# Patient Record
Sex: FEMALE | Race: WHITE | URBAN - METROPOLITAN AREA
[De-identification: names, ages, dates, MRNs, and addresses within clinical notes are randomized per-mention and may not be internally consistent; named-entity substitution may affect disease eponyms.]

---

## 2023-06-02 ENCOUNTER — OFFICE VISIT (OUTPATIENT)
Dept: OTOLARYNGOLOGY | Facility: CLINIC | Age: 83
End: 2023-06-02

## 2023-06-02 VITALS — WEIGHT: 132 LBS | HEIGHT: 62 IN | BODY MASS INDEX: 24.29 KG/M2

## 2023-06-02 DIAGNOSIS — R09.82 ALLERGIC RHINITIS WITH POSTNASAL DRIP: Primary | ICD-10-CM

## 2023-06-02 DIAGNOSIS — J30.9 ALLERGIC RHINITIS WITH POSTNASAL DRIP: Primary | ICD-10-CM

## 2023-06-02 DIAGNOSIS — H61.23 CERUMEN DEBRIS ON TYMPANIC MEMBRANE OF BOTH EARS: ICD-10-CM

## 2023-06-02 DIAGNOSIS — J34.89 NASAL SEPTAL PERFORATION: ICD-10-CM

## 2023-06-02 PROCEDURE — 99203 OFFICE O/P NEW LOW 30 MIN: CPT | Performed by: SPECIALIST

## 2023-06-02 PROCEDURE — 1159F MED LIST DOCD IN RCRD: CPT | Performed by: SPECIALIST

## 2023-06-02 PROCEDURE — 1160F RVW MEDS BY RX/DR IN RCRD: CPT | Performed by: SPECIALIST

## 2023-06-02 PROCEDURE — 3008F BODY MASS INDEX DOCD: CPT | Performed by: SPECIALIST

## 2023-06-02 RX ORDER — LEVOTHYROXINE SODIUM 0.05 MG/1
1 TABLET ORAL
COMMUNITY
Start: 2022-07-19

## 2023-06-02 RX ORDER — PRAVASTATIN SODIUM 40 MG
40 TABLET ORAL DAILY
COMMUNITY
Start: 2022-10-24

## 2023-06-02 RX ORDER — LEVETIRACETAM 250 MG/1
1 TABLET ORAL AS DIRECTED
COMMUNITY
Start: 2021-02-01

## 2023-06-02 RX ORDER — ALPRAZOLAM 0.25 MG/1
TABLET ORAL
COMMUNITY

## 2023-06-02 RX ORDER — ONDANSETRON 4 MG/1
1 TABLET, FILM COATED ORAL AS DIRECTED
COMMUNITY

## 2023-06-02 RX ORDER — LOSARTAN POTASSIUM 50 MG/1
1 TABLET ORAL AS DIRECTED
COMMUNITY
Start: 2021-10-27

## 2023-06-02 RX ORDER — POTASSIUM CHLORIDE 20 MEQ/1
TABLET, EXTENDED RELEASE ORAL
COMMUNITY

## 2023-06-02 RX ORDER — HYDROCODONE BITARTRATE AND ACETAMINOPHEN 7.5; 325 MG/1; MG/1
TABLET ORAL
COMMUNITY
Start: 2022-01-06

## 2023-06-02 RX ORDER — LISINOPRIL 10 MG/1
1 TABLET ORAL AS DIRECTED
COMMUNITY

## 2023-06-02 RX ORDER — AMOXICILLIN 875 MG/1
TABLET, COATED ORAL
COMMUNITY

## 2023-06-02 RX ORDER — OMEPRAZOLE 20 MG/1
20 CAPSULE, DELAYED RELEASE ORAL AS DIRECTED
COMMUNITY

## 2023-06-02 RX ORDER — ASPIRIN 81 MG/1
TABLET, CHEWABLE ORAL
COMMUNITY

## 2023-06-02 RX ORDER — AZELASTINE 1 MG/ML
SPRAY, METERED NASAL AS DIRECTED
COMMUNITY

## 2023-06-02 NOTE — PATIENT INSTRUCTIONS
Cerumen was fully cleaned from both ears. I asked you to stop the Flonase and try Zyrtec or cetirizine 10 mg at bedtime. You have an incidental septal perforation. Follow-up with any additional questions or problems.

## 2023-10-13 ENCOUNTER — OFFICE VISIT (OUTPATIENT)
Dept: OTOLARYNGOLOGY | Facility: CLINIC | Age: 83
End: 2023-10-13

## 2023-10-13 VITALS — HEIGHT: 62 IN | BODY MASS INDEX: 24.29 KG/M2 | WEIGHT: 132 LBS

## 2023-10-13 DIAGNOSIS — R09.82 POSTNASAL DRIP: ICD-10-CM

## 2023-10-13 DIAGNOSIS — J34.89 NASAL SEPTAL PERFORATION: Primary | ICD-10-CM

## 2023-10-13 PROCEDURE — 3008F BODY MASS INDEX DOCD: CPT | Performed by: SPECIALIST

## 2023-10-13 PROCEDURE — 99213 OFFICE O/P EST LOW 20 MIN: CPT | Performed by: SPECIALIST

## 2023-10-13 PROCEDURE — 1160F RVW MEDS BY RX/DR IN RCRD: CPT | Performed by: SPECIALIST

## 2023-10-13 PROCEDURE — 1159F MED LIST DOCD IN RCRD: CPT | Performed by: SPECIALIST

## 2023-10-13 RX ORDER — IPRATROPIUM BROMIDE 42 UG/1
2 SPRAY, METERED NASAL 3 TIMES DAILY
Qty: 15 ML | Refills: 3 | Status: SHIPPED | OUTPATIENT
Start: 2023-10-13

## 2023-10-13 NOTE — PATIENT INSTRUCTIONS
Placed on a trial of Atrovent nasal spray 2 sprays to each nostril up to 3 times daily for your postnasal drip. Follow-up in 6 months time, sooner if problems.

## 2023-11-17 ENCOUNTER — OFFICE VISIT (OUTPATIENT)
Dept: OTOLARYNGOLOGY | Facility: CLINIC | Age: 83
End: 2023-11-17

## 2023-11-17 VITALS — HEIGHT: 62 IN | WEIGHT: 132 LBS | BODY MASS INDEX: 24.29 KG/M2

## 2023-11-17 DIAGNOSIS — J34.89 NASAL SEPTAL PERFORATION: ICD-10-CM

## 2023-11-17 DIAGNOSIS — R09.82 POSTNASAL DRIP: ICD-10-CM

## 2023-11-17 DIAGNOSIS — H92.03 REFERRED OTALGIA OF BOTH EARS: Primary | ICD-10-CM

## 2023-11-17 PROCEDURE — 3008F BODY MASS INDEX DOCD: CPT | Performed by: SPECIALIST

## 2023-11-17 PROCEDURE — 1160F RVW MEDS BY RX/DR IN RCRD: CPT | Performed by: SPECIALIST

## 2023-11-17 PROCEDURE — 1159F MED LIST DOCD IN RCRD: CPT | Performed by: SPECIALIST

## 2023-11-17 PROCEDURE — 99214 OFFICE O/P EST MOD 30 MIN: CPT | Performed by: SPECIALIST

## 2023-11-17 RX ORDER — AMOXICILLIN AND CLAVULANATE POTASSIUM 875; 125 MG/1; MG/1
1 TABLET, FILM COATED ORAL 2 TIMES DAILY
COMMUNITY
Start: 2023-11-10

## 2023-11-17 NOTE — PATIENT INSTRUCTIONS
No evidence of middle ear fluid. Also reviewed CT scan and MRI scan and neither of which made mention of mastoiditis. Suspect the pain is referred from either ear past mandibular trauma and possible TMJ arthralgia. The other option would be spinal stenosis. You can consider an MRI of the cervical spine. You can also consider a trial of pregabalin or gabapentin. You have a septal perforation. Try Zyrtec or Xyzal.  Decrease nasal irrigation to 1 or at most 2 times per day.

## 2024-04-12 ENCOUNTER — OFFICE VISIT (OUTPATIENT)
Dept: OTOLARYNGOLOGY | Facility: CLINIC | Age: 84
End: 2024-04-12
Payer: MEDICARE

## 2024-04-12 VITALS — WEIGHT: 133 LBS | BODY MASS INDEX: 24.48 KG/M2 | HEIGHT: 62 IN

## 2024-04-12 DIAGNOSIS — H61.23 CERUMEN DEBRIS ON TYMPANIC MEMBRANE OF BOTH EARS: ICD-10-CM

## 2024-04-12 DIAGNOSIS — J34.89 NASAL CRUSTING: ICD-10-CM

## 2024-04-12 DIAGNOSIS — J34.89 NASAL SEPTAL PERFORATION: Primary | ICD-10-CM

## 2024-04-12 PROCEDURE — 1159F MED LIST DOCD IN RCRD: CPT | Performed by: SPECIALIST

## 2024-04-12 PROCEDURE — 99213 OFFICE O/P EST LOW 20 MIN: CPT | Performed by: SPECIALIST

## 2024-04-12 PROCEDURE — 69210 REMOVE IMPACTED EAR WAX UNI: CPT | Performed by: SPECIALIST

## 2024-04-12 PROCEDURE — 1160F RVW MEDS BY RX/DR IN RCRD: CPT | Performed by: SPECIALIST

## 2024-04-12 PROCEDURE — 3008F BODY MASS INDEX DOCD: CPT | Performed by: SPECIALIST

## 2024-04-12 RX ORDER — CEPHALEXIN 500 MG/1
500 CAPSULE ORAL EVERY 8 HOURS
Qty: 21 CAPSULE | Refills: 0 | Status: SHIPPED | OUTPATIENT
Start: 2024-04-12

## 2024-04-13 NOTE — PROGRESS NOTES
Michelle Alvarenga is a 83 year old female.   Chief Complaint   Patient presents with    Follow - Up     referred otalgia of both ears     HPI:   Patient here to get her ears cleaned and checked.  Also wanted to discuss her septal perforation.    Current Outpatient Medications   Medication Sig Dispense Refill    cephalexin 500 MG Oral Cap Take 1 capsule (500 mg total) by mouth every 8 (eight) hours. 21 capsule 0    ipratropium 0.06 % Nasal Solution 2 sprays by Nasal route 3 (three) times daily. 15 mL 3    ALPRAZolam 0.25 MG Oral Tab alprazolam 0.25 mg tablet   TAKE ONE OR TWO TABLETS BY MOUTH AT BEDTIME AS NEEDED FOR ANXIETY      aspirin 81 MG Oral Chew Tab aspirin 81 mg chewable tablet   CHEW AND SWALLOW ONE TABLET BY MOUTH ONE TIME DAILY      azelastine 0.1 % Nasal Solution by Nasal route As Directed.      HYDROcodone-acetaminophen 7.5-325 MG Oral Tab hydrocodone 7.5 mg-acetaminophen 325 mg tablet   Take 1 tablet by mouth if needed in the morning and at bedtime for severe pain.      levETIRAcetam 250 MG Oral Tab Take 1 tablet (250 mg total) by mouth As Directed.      levothyroxine 50 MCG Oral Tab Take 1 tablet (50 mcg total) by mouth before breakfast.      losartan 50 MG Oral Tab Take 1 tablet (50 mg total) by mouth As Directed.      lisinopril 10 MG Oral Tab Take 1 tablet (10 mg total) by mouth As Directed.      metoprolol tartrate 25 MG Oral Tab metoprolol tartrate 25 mg tablet   TAKE ONE TABLET BY MOUTH TWICE DAILY      omeprazole 20 MG Oral Capsule Delayed Release Take 1 capsule (20 mg total) by mouth As Directed.      mupirocin 2 % External Ointment As Directed.      ondansetron (ZOFRAN) 4 mg tablet Take 1 tablet (4 mg total) by mouth As Directed.      potassium chloride 20 MEQ Oral Tab CR potassium chloride ER 20 mEq tablet,extended release(part/cryst)      pravastatin 40 MG Oral Tab Take 1 tablet (40 mg total) by mouth daily.      amoxicillin clavulanate 875-125 MG Oral Tab Take 1 tablet by mouth 2 (two) times  daily. (Patient not taking: Reported on 4/12/2024)      amoxicillin 875 MG Oral Tab amoxicillin 875 mg tablet   Take 1 tablet by mouth 2 times a Day (Patient not taking: Reported on 4/12/2024)        Past Medical History:    Essential hypertension      Social History:  Social History     Socioeconomic History    Marital status:    Tobacco Use    Smoking status: Never    Smokeless tobacco: Never   Vaping Use    Vaping status: Never Used   Substance and Sexual Activity    Alcohol use: Yes     Comment: glass of wine occasionally    Drug use: Never        REVIEW OF SYSTEMS:   GENERAL HEALTH: feels well otherwise  GENERAL : denies fever, chills, sweats, weight loss, weight gain  SKIN: denies any unusual skin lesions or rashes  RESPIRATORY: denies shortness of breath with exertion  NEURO: denies headaches    EXAM:   Ht 5' 2\" (1.575 m)   Wt 133 lb (60.3 kg)   BMI 24.33 kg/m²   System Details   Skin Inspection - Normal.   Constitutional Overall appearance - Normal.   Head/Face Facial features - Normal. Eyebrows - Normal. Skull - Normal.   Eyes Conjunctiva - Right: Normal, Left: Normal. Pupil - Right: Normal, Left: Normal.    Ears Inspection - Right: Normal, Left: Normal.   Ears = bilateral cerumen occlussions.    Fully cleaned under microscope using instrumentation and suctioning.    Normal tympanic membranes.     Nasal External nose - Normal.   Nasal septum -moderate septal perforation  Turbinates -nasal crusting and purulent postnasal drainage   Oral/Oropharynx Lips - Normal, Tonsils - Normal, Tongue - Normal    Neck Exam Inspection - Normal. Palpation - Normal. Parotid gland - Normal. Thyroid gland - Normal.   Lymph Detail Submental. Submandibular. Anterior cervical. Posterior cervical. Supraclavicular all without enlargement   Psychiatric Orientation - Oriented to time, place, person & situation. Appropriate mood and affect.   Neurological Memory - Normal. Cranial nerves - Cranial nerves II through XII grossly  intact.     ASSESSMENT AND PLAN:   1. Nasal septal perforation  Patient to consider placement of a septal button.  This can be done under MAC anesthesia.  Follow-up in 4 to 6 months time, sooner if problems.    2. Cerumen debris on tympanic membrane of both ears  Fully cleaned.  - Removal Impacted Cerumen Requiring Instrumentation, Bilateral    3. Nasal crusting  Please send a 1 week trial of Keflex.      The patient indicates understanding of these issues and agrees to the plan.      Cassy Smith MD  4/12/2024  9:20 PM

## 2024-04-13 NOTE — PATIENT INSTRUCTIONS
Patient with a septal perforation which is moderate in size.  There is bilateral nasal crusting.  You were placed on a trial of Keflex.  Your ears were fully cleaned of cerumen.  We discussed a nasal button which can be placed under MAC anesthesia.  If you decide to do this please let me know so it can be scheduled.

## 2024-04-30 ENCOUNTER — TELEPHONE (OUTPATIENT)
Dept: OTOLARYNGOLOGY | Facility: CLINIC | Age: 84
End: 2024-04-30

## 2024-04-30 DIAGNOSIS — J34.89 NASAL SEPTAL PERFORATION: Primary | ICD-10-CM

## 2024-04-30 NOTE — TELEPHONE ENCOUNTER
Patient scheduled for PLACEMENT OF SEPTAL BUTTON on 5/13/24 at [facility].     Faxed medical clearance request to PCP.

## 2024-05-01 ENCOUNTER — TELEPHONE (OUTPATIENT)
Dept: CASE MANAGEMENT | Age: 84
End: 2024-05-01

## 2024-05-01 NOTE — TELEPHONE ENCOUNTER
Good Morning    Patient is scheduled for DOS 5/13/24.     is unable to obtain prior auth since [facility] PCP.    PCP is Dr Carlos Falcon thru [facility].    PCP office will need to obtain PA for upcoming surgery.    Please confirm you have received this message and reached out to PCP to have them start PA process on patients behalf.    [facility] may not be within network with this specific health plan and patient may be redirected.     Thank you for your help    Cassy  [facility]

## 2024-05-02 NOTE — TELEPHONE ENCOUNTER
Called PCP office and informed them to start the PA for surgery. The office will also reach out to patient to get medical clearance before surgery.

## 2024-05-06 ENCOUNTER — TELEPHONE (OUTPATIENT)
Dept: OTOLARYNGOLOGY | Facility: CLINIC | Age: 84
End: 2024-05-06

## 2024-05-06 NOTE — TELEPHONE ENCOUNTER
Per patient she has a procedure next week and her primary care physician Lilliam with [facility]  needs the request for an EKG.  Please advise

## 2024-05-07 RX ORDER — ACETAMINOPHEN 500 MG
500 TABLET ORAL
COMMUNITY

## 2024-05-07 NOTE — TELEPHONE ENCOUNTER
Called PCP office. Patient is scheduled for preoperative physical on 5/6/24. Office does not need order for EKG.     Refaxed paperwork to PCP office to start prior authorization for surgery scheduled on 5/13/24.     Left voice message for patient stating that her PCP will order an EKG if needed,

## 2024-05-09 ENCOUNTER — TELEPHONE (OUTPATIENT)
Dept: OTOLARYNGOLOGY | Facility: CLINIC | Age: 84
End: 2024-05-09

## 2024-05-09 NOTE — TELEPHONE ENCOUNTER
Received clearance fax from PCP office with medical clearance for surgery.     Spoke with PCP staff about PA for surgery and was notified that they do not obtain prior authorizations for surgery.

## 2024-05-10 NOTE — DISCHARGE INSTRUCTIONS
HOME INSTRUCTIONS  Take tylenol or norco 7.5 mg for pain  Diet as tolerated  Do not manipulate your nose  Follow up in 2 weeks time, any office  Call with any problems.  No prescriptions  [facility] HOME CARE INSTRUCTIONS: POST-OP ANESTHESIA  The medication that you received for sedation or general anesthesia can last up to 24 hours. Your judgment and reflexes may be altered, even if you feel like your normal self.      We Recommend:   Do not drive any motor vehicle or bicycle   Avoid mowing the lawn, playing sports, or working with power tools/applicances (power saws, electric knives or mixers)   That you have someone stay with you on your first night home   Do not drink alcohol or take sleeping pills or tranquilizers   Do not sign legal documents within 24 hours of your procedure   If you had a nerve block for your surgery, take extra care not to put any pressure on your arm or hand for 24 hours    It is normal:  For you to have a sore throat if you had a breathing tube during surgery (while you were asleep!). The sore throat should get better within 48 hours. You can gargle with warm salt water (1/2 tsp in 4 oz warm water) or use a throat lozenge for comfort  To feel muscle aches or soreness especially in the abdomen, chest or neck. The achy feeling should go away in the next 24 hours  To feel weak, sleepy or \"wiped out\". Your should start feeling better in the next 24 hours.   To experience mild discomforts such as sore lip or tongue, headache, cramps, gas pains or a bloated feeling in your abdomen.   To experience mild back pain or soreness for a day or two if you had spinal or epidural anesthesia.   If you had laparoscopic surgery, to feel shoulder pain or discomfort on the day of surgery.   For some patients to have nausea after surgery/anesthesia    If you feel nausea or experience vomiting:   Try to move around less.   Eat less than usual or drink only liquids until the next morning   Nausea should resolve in  about 24 hours    If you have a problem when you are at home:    Call your surgeons office   Discharge Instructions: After Your Surgery  You’ve just had surgery. During surgery, you were given medicine called anesthesia to keep you relaxed and free of pain. After surgery, you may have some pain or nausea. This is common. Here are some tips for feeling better and getting well after surgery.   Going home  Your healthcare provider will show you how to take care of yourself when you go home. They'll also answer your questions. Have an adult family member or friend drive you home. For the first 24 hours after your surgery:   Don't drive or use heavy equipment.  Don't make important decisions or sign legal papers.  Take medicines as directed.  Don't drink alcohol.  Have someone stay with you, if needed. They can watch for problems and help keep you safe.  Be sure to go to all follow-up visits with your healthcare provider. And rest after your surgery for as long as your provider tells you to.   Coping with pain  If you have pain after surgery, pain medicine will help you feel better. Take it as directed, before pain becomes severe. Also, ask your healthcare provider or pharmacist about other ways to control pain. This might be with heat, ice, or relaxation. And follow any other instructions your surgeon or nurse gives you.      Stay on schedule with your medicine.     Tips for taking pain medicine  To get the best relief possible, remember these points:   Pain medicines can upset your stomach. Taking them with a little food may help.  Most pain relievers taken by mouth need at least 20 to 30 minutes to start to work.  Don't wait till your pain becomes severe before you take your medicine. Try to time your medicine so that you can take it before starting an activity. This might be before you get dressed, go for a walk, or sit down for dinner.  Constipation is a common side effect of some pain medicines. Call your  healthcare provider before taking any medicines such as laxatives or stool softeners to help ease constipation. Also ask if you should skip any foods. Drinking lots of fluids and eating foods such as fruits and vegetables that are high in fiber can also help. Remember, don't take laxatives unless your surgeon has prescribed them.  Drinking alcohol and taking pain medicine can cause dizziness and slow your breathing. It can even be deadly. Don't drink alcohol while taking pain medicine.  Pain medicine can make you react more slowly to things. Don't drive or run machinery while taking pain medicine.  Your healthcare provider may tell you to take acetaminophen to help ease your pain. Ask them how much you're supposed to take each day. Acetaminophen or other pain relievers may interact with your prescription medicines or other over-the-counter (OTC) medicines. Some prescription medicines have acetaminophen and other ingredients in them. Using both prescription and OTC acetaminophen for pain can cause you to accidentally overdose. Read the labels on your OTC medicines with care. This will help you to clearly know the list of ingredients, how much to take, and any warnings. It may also help you not take too much acetaminophen. If you have questions or don't understand the information, ask your pharmacist or healthcare provider to explain it to you before you take the OTC medicine.   Managing nausea  Some people have an upset stomach (nausea) after surgery. This is often because of anesthesia, pain, or pain medicine, less movement of food in the stomach, or the stress of surgery. These tips will help you handle nausea and eat healthy foods as you get better. If you were on a special food plan before surgery, ask your healthcare provider if you should follow it while you get better. Check with your provider on how your eating should progress. It may depend on the surgery you had. These general tips may help:   Don't push  yourself to eat. Your body will tell you when to eat and how much.  Start off with clear liquids and soup. They're easier to digest.  Next try semi-solid foods as you feel ready. These include mashed potatoes, applesauce, and gelatin.  Slowly move to solid foods. Don’t eat fatty, rich, or spicy foods at first.  Don't force yourself to have 3 large meals a day. Instead eat smaller amounts more often.  Take pain medicines with a small amount of solid food, such as crackers or toast. This helps prevent nausea.  When to call your healthcare provider  Call your healthcare provider right away if you have any of these:   You still have too much pain, or the pain gets worse, after taking the medicine. The medicine may not be strong enough. Or there may be a complication from the surgery.  You feel too sleepy, dizzy, or groggy. The medicine may be too strong.  Side effects such as nausea or vomiting. Your healthcare provider may advise taking other medicines to .  Skin changes such as rash, itching, or hives. This may mean you have an allergic reaction. Your provider may advise taking other medicines.  The incision looks different (for instance, part of it opens up).  Bleeding or fluid leaking from the incision site, and weren't told to expect that.  Fever of 100.4°F (38°C) or higher, or as directed by your provider.  Call 911  Call 911 right away if you have:   Trouble breathing  Facial swelling    If you have obstructive sleep apnea   You were given anesthesia medicine during surgery to keep you comfortable and free of pain. After surgery, you may have more apnea spells because of this medicine and other medicines you were given. The spells may last longer than normal.    At home:  Keep using the continuous positive airway pressure (CPAP) device when you sleep. Unless your healthcare provider tells you not to, use it when you sleep, day or night. CPAP is a common device used to treat obstructive sleep apnea.  Talk with  your provider before taking any pain medicine, muscle relaxants, or sedatives. Your provider will tell you about the possible dangers of taking these medicines.  Contact your provider if your sleeping changes a lot even when taking medicines as directed.  Saleem last reviewed this educational content on 10/1/2021  © 8294-8519 [employer]. All rights reserved. This information is not intended as a substitute for professional medical care. Always follow your healthcare professional's instructions.      Take tylenol or norco 7.5 mg for pain  Diet as tolerated  Do not manipulate your nose  Follow up in 2 weeks time, any office  Call with any problems.  No prescriptions

## 2024-05-10 NOTE — TELEPHONE ENCOUNTER
Obtained medical clearance for upcoming procedure from RAVI Parekh.  I will have the clearance as well as EKG and labs scanned to the chart.

## 2024-05-12 NOTE — H&P
[facility]  part of [facility]    History and Physical    Michelle Alvarenga Patient Status:  Hospital Outpatient Surgery    1940 MRN [id number]   Location [facility] OPERATING ROOM Attending Cassy Smith MD   Hosp Day # 0 PCP PHYSICIAN NONSTAFF     Date:  2024  Date of Admission:  (Not on file)    History provided by:patient  HPI:   No chief complaint on file.    HPI  Patient with post nasal drainage and a long standing septal perforation.      History     Past Medical History:    Cancer (HCC)    breast cancer    Essential hypertension    Exposure to medical diagnostic radiation    Hearing impairment    hearing aides    High blood pressure    Visual impairment    glasses     Past Surgical History:   Procedure Laterality Date    Cholecystectomy      Hip replacement surgery Right     Lumpectomy left Left     Other surgical history Left     Left Jaw Repair    Total hip replacement       History reviewed. No pertinent family history.  Social History:  Social History     Socioeconomic History    Marital status:    Tobacco Use    Smoking status: Former     Types: Cigarettes    Smokeless tobacco: Never   Vaping Use    Vaping status: Never Used   Substance and Sexual Activity    Alcohol use: Yes     Comment: glass of wine occasionally    Drug use: Never     Allergies/Medications:   Allergies: No Known Allergies  Medications: astelin, benedryl. Calcium, escitalopram, norco,  oral tablet, levothyroxine, losartan, potassium chloride, pravastatin, triamcinolone, xanax    Review of Systems:   Review of Systems  Postnasal drainage with septal perforation.    Physical Exam:   Vital Signs:  Height 5' 2\" (1.575 m), weight 140 lb (63.5 kg).  Physical Exam  Ears: normal  Nose: moderate septal perforation  Mouth: normal  Neck: no adenopathy  Lungs: clear  Heart: regular rate and rhythm.  No murmurs  Cervical Papanicolaou to be done in MD's office    Results:   No results found for:  \"WBC\", \"HGB\", \"HCT\", \"PLT\", \"CREATSERUM\", \"BUN\", \"NA\", \"K\", \"CL\", \"CO2\", \"GLU\", \"CA\", \"ALB\", \"ALKPHO\", \"BILT\", \"TP\", \"AST\", \"ALT\", \"PTT\", \"INR\", \"PT\", \"T4F\", \"TSH\", \"TSHREFLEX\", \"HARESH\", \"LIP\", \"GGT\", \"PSA\", \"DDIMER\", \"ESRML\", \"ESRPF\", \"CRP\", \"BNP\", \"MG\", \"PHOS\", \"TROP\", \"TROPHS\", \"CK\", \"CKMB\", \"SUE\", \"RPR\", \"B12\", \"ETOH\", \"POCGLU\"  No results found.        Assessment/Plan:   Impression: septal perforation with postnasal drainage  Plan: placement of septal perforation.    Medical clearance scanned to chart.          Cassy Smith MD  5/12/2024

## 2024-05-13 ENCOUNTER — ANESTHESIA (OUTPATIENT)
Dept: SURGERY | Facility: HOSPITAL | Age: 84
End: 2024-05-13

## 2024-05-13 ENCOUNTER — ANESTHESIA EVENT (OUTPATIENT)
Dept: SURGERY | Facility: HOSPITAL | Age: 84
End: 2024-05-13

## 2024-05-13 ENCOUNTER — TELEPHONE (OUTPATIENT)
Dept: OTOLARYNGOLOGY | Facility: CLINIC | Age: 84
End: 2024-05-13

## 2024-05-13 DIAGNOSIS — J34.89 NASAL SEPTAL PERFORATION: Primary | ICD-10-CM

## 2024-05-13 NOTE — ANESTHESIA PREPROCEDURE EVALUATION
Anesthesia PreOp Note    HPI:     Michelle Alvarenga is a 84 year old female who presents for preoperative consultation requested by: Cassy Smith MD    Date of Surgery: 5/13/2024    Procedure(s):  Placement of septal button  Indication: Nasal septal perforation [J34.89]    Relevant Problems   No relevant active problems       NPO:                         History Review:  There are no problems to display for this patient.      Past Medical History:    Cancer (HCC)    breast cancer    Essential hypertension    Exposure to medical diagnostic radiation    Hearing impairment    hearing aides    High blood pressure    Visual impairment    glasses       Past Surgical History:   Procedure Laterality Date    Cholecystectomy      Hip replacement surgery Right     Lumpectomy left Left     Other surgical history Left     Left Jaw Repair    Total hip replacement         No medications prior to admission.     No current Epic-ordered facility-administered medications on file.     Current Outpatient Medications Ordered in Epic   Medication Sig Dispense Refill    cyanocobalamin 1000 MCG Oral Tab Take 100 mcg by mouth daily.      HYDROcodone-acetaminophen 7.5-325 MG Oral Tab hydrocodone 7.5 mg-acetaminophen 325 mg tablet   Take 1 tablet by mouth if needed in the morning and at bedtime for severe pain.      levothyroxine 50 MCG Oral Tab Take 1 tablet (50 mcg total) by mouth before breakfast.      losartan 50 MG Oral Tab Take 1 tablet (50 mg total) by mouth every morning.      lisinopril 10 MG Oral Tab Take 1 tablet (10 mg total) by mouth every morning.      potassium chloride 20 MEQ Oral Tab CR Take 1 tablet (20 mEq total) by mouth every morning.      pravastatin 40 MG Oral Tab Take 1 tablet (40 mg total) by mouth every morning.      acetaminophen 500 MG Oral Tab Take 1 tablet (500 mg total) by mouth daily as needed.      ipratropium 0.06 % Nasal Solution 2 sprays by Nasal route 3 (three) times daily. 15 mL 3    azelastine 0.1 % Nasal  Solution by Nasal route As Directed.      mupirocin 2 % External Ointment As Directed.      ondansetron (ZOFRAN) 4 mg tablet Take 1 tablet (4 mg total) by mouth As Directed.         No Known Allergies    History reviewed. No pertinent family history.  Social History     Socioeconomic History    Marital status:    Tobacco Use    Smoking status: Former     Types: Cigarettes    Smokeless tobacco: Never   Vaping Use    Vaping status: Never Used   Substance and Sexual Activity    Alcohol use: Yes     Comment: glass of wine occasionally    Drug use: Never       Available pre-op labs reviewed.             Vital Signs:  Body mass index is 25.61 kg/m².   height is 1.575 m (5' 2\") and weight is 63.5 kg (140 lb).   Vitals:    05/07/24 1714   Weight: 63.5 kg (140 lb)   Height: 1.575 m (5' 2\")        Anesthesia Evaluation      Airway   Mallampati: II  TM distance: >3 FB  Neck ROM: full  Dental    (+) upper dentures and lower dentures    Pulmonary - negative ROS and normal exam   Cardiovascular - normal exam    Neuro/Psych - negative ROS     GI/Hepatic/Renal      Endo/Other    (+) hypothyroidism  Abdominal                  Anesthesia Plan:   ASA:  3  Plan:   MAC  Plan Comments: I have discussed the anesthetic plan, major risks and alternatives with the patient and answered all questions. The patient desires to proceed with surgery and anesthesia as planned.     Informed Consent Plan and Risks Discussed With:  Patient      I have informed Michelle Alvarenga and/or legal guardian or family member of the nature of the anesthetic plan, benefits, risks including possible dental damage if relevant, major complications, and any alternative forms of anesthetic management.   All of the patient's questions were answered to the best of my ability. The patient desires the anesthetic management as planned.  Ravinder Rodriguez MD  5/13/2024 8:00 AM  Present on Admission:  **None**

## 2024-05-13 NOTE — TELEPHONE ENCOUNTER
Patients close friend is calling in to inform that the patient is scheduled for surgery today, but will need to reschedule, due to having vertigo and vomiting. The patient is with the friend who is calling in. ENT Nurse was informed.

## 2024-05-20 ENCOUNTER — HOSPITAL ENCOUNTER (OUTPATIENT)
Facility: HOSPITAL | Age: 84
Setting detail: HOSPITAL OUTPATIENT SURGERY
Discharge: HOME | End: 2024-05-20
Attending: SPECIALIST | Admitting: SPECIALIST

## 2024-05-20 ENCOUNTER — TELEPHONE (OUTPATIENT)
Dept: OTOLARYNGOLOGY | Facility: CLINIC | Age: 84
End: 2024-05-20

## 2024-05-20 VITALS
HEART RATE: 64 BPM | BODY MASS INDEX: 25.76 KG/M2 | OXYGEN SATURATION: 99 % | HEIGHT: 62 IN | TEMPERATURE: 99 F | RESPIRATION RATE: 18 BRPM | DIASTOLIC BLOOD PRESSURE: 58 MMHG | WEIGHT: 140 LBS | SYSTOLIC BLOOD PRESSURE: 138 MMHG

## 2024-05-20 DIAGNOSIS — J34.89 NASAL SEPTAL PERFORATION: Primary | ICD-10-CM

## 2024-05-20 PROCEDURE — 093K7ZZ CONTROL BLEEDING IN NASAL MUCOSA AND SOFT TISSUE, VIA NATURAL OR ARTIFICIAL OPENING: ICD-10-PCS | Performed by: SPECIALIST

## 2024-05-20 RX ORDER — SODIUM CHLORIDE, SODIUM LACTATE, POTASSIUM CHLORIDE, CALCIUM CHLORIDE 600; 310; 30; 20 MG/100ML; MG/100ML; MG/100ML; MG/100ML
INJECTION, SOLUTION INTRAVENOUS CONTINUOUS
Status: DISCONTINUED | OUTPATIENT
Start: 2024-05-20 | End: 2024-05-20

## 2024-05-20 RX ORDER — HYDROCODONE BITARTRATE AND ACETAMINOPHEN 7.5; 325 MG/1; MG/1
1 TABLET ORAL EVERY 6 HOURS PRN
Qty: 15 TABLET | Refills: 0 | Status: SHIPPED | OUTPATIENT
Start: 2024-05-20 | End: 2024-06-04

## 2024-05-20 RX ORDER — MIDAZOLAM HYDROCHLORIDE 1 MG/ML
INJECTION INTRAMUSCULAR; INTRAVENOUS AS NEEDED
Status: DISCONTINUED | OUTPATIENT
Start: 2024-05-20 | End: 2024-05-20 | Stop reason: SURG

## 2024-05-20 RX ORDER — ONDANSETRON 2 MG/ML
4 INJECTION INTRAMUSCULAR; INTRAVENOUS EVERY 6 HOURS PRN
Status: DISCONTINUED | OUTPATIENT
Start: 2024-05-20 | End: 2024-05-20

## 2024-05-20 RX ORDER — METOCLOPRAMIDE HYDROCHLORIDE 5 MG/ML
10 INJECTION INTRAMUSCULAR; INTRAVENOUS EVERY 8 HOURS PRN
Status: DISCONTINUED | OUTPATIENT
Start: 2024-05-20 | End: 2024-05-20

## 2024-05-20 RX ORDER — MORPHINE SULFATE 4 MG/ML
2 INJECTION, SOLUTION INTRAMUSCULAR; INTRAVENOUS EVERY 10 MIN PRN
Status: DISCONTINUED | OUTPATIENT
Start: 2024-05-20 | End: 2024-05-20

## 2024-05-20 RX ORDER — LIDOCAINE HYDROCHLORIDE 10 MG/ML
INJECTION, SOLUTION EPIDURAL; INFILTRATION; INTRACAUDAL; PERINEURAL AS NEEDED
Status: DISCONTINUED | OUTPATIENT
Start: 2024-05-20 | End: 2024-05-20 | Stop reason: SURG

## 2024-05-20 RX ORDER — ACETAMINOPHEN 500 MG
1000 TABLET ORAL ONCE
Status: COMPLETED | OUTPATIENT
Start: 2024-05-20 | End: 2024-05-20

## 2024-05-20 RX ORDER — LIDOCAINE HYDROCHLORIDE 40 MG/ML
INJECTION, SOLUTION RETROBULBAR AS NEEDED
Status: DISCONTINUED | OUTPATIENT
Start: 2024-05-20 | End: 2024-05-20 | Stop reason: HOSPADM

## 2024-05-20 RX ORDER — HYDROMORPHONE HYDROCHLORIDE 1 MG/ML
0.2 INJECTION, SOLUTION INTRAMUSCULAR; INTRAVENOUS; SUBCUTANEOUS EVERY 5 MIN PRN
Status: DISCONTINUED | OUTPATIENT
Start: 2024-05-20 | End: 2024-05-20

## 2024-05-20 RX ORDER — NALOXONE HYDROCHLORIDE 0.4 MG/ML
0.08 INJECTION, SOLUTION INTRAMUSCULAR; INTRAVENOUS; SUBCUTANEOUS AS NEEDED
Status: DISCONTINUED | OUTPATIENT
Start: 2024-05-20 | End: 2024-05-20

## 2024-05-20 RX ADMIN — LIDOCAINE HYDROCHLORIDE 40 MG: 10 INJECTION, SOLUTION EPIDURAL; INFILTRATION; INTRACAUDAL; PERINEURAL at 14:14:00

## 2024-05-20 RX ADMIN — MIDAZOLAM HYDROCHLORIDE 1 MG: 1 INJECTION INTRAMUSCULAR; INTRAVENOUS at 14:16:00

## 2024-05-20 RX ADMIN — SODIUM CHLORIDE, SODIUM LACTATE, POTASSIUM CHLORIDE, CALCIUM CHLORIDE: 600; 310; 30; 20 INJECTION, SOLUTION INTRAVENOUS at 14:11:00

## 2024-05-20 RX ADMIN — MIDAZOLAM HYDROCHLORIDE 1 MG: 1 INJECTION INTRAMUSCULAR; INTRAVENOUS at 14:13:00

## 2024-05-20 RX ADMIN — SODIUM CHLORIDE, SODIUM LACTATE, POTASSIUM CHLORIDE, CALCIUM CHLORIDE: 600; 310; 30; 20 INJECTION, SOLUTION INTRAVENOUS at 14:32:00

## 2024-05-20 NOTE — INTERVAL H&P NOTE
Pre-op Diagnosis: Nasal septal perforation [J34.89]    The above referenced H&P was reviewed by Cassy Smith MD on 5/20/2024, the patient was examined and no significant changes have occurred in the patient's condition since the H&P was performed.  I discussed with the patient and/or legal representative the potential benefits, risks and side effects of this procedure; the likelihood of the patient achieving goals; and potential problems that might occur during recuperation.  I discussed reasonable alternatives to the procedure, including risks, benefits and side effects related to the alternatives and risks related to not receiving this procedure.  We will proceed with procedure as planned.

## 2024-05-20 NOTE — OPERATIVE REPORT
[facility]    PATIENT'S NAME: MARGIE BAUMAN   ATTENDING PHYSICIAN: Cassy Smith MD   OPERATING PHYSICIAN: Cassy Smith MD   PATIENT ACCOUNT#:   [id number]    LOCATION:  [facility] 6 [facility] 10  MEDICAL RECORD #:   [id number]       YOB: 1940  ADMISSION DATE:       05/20/2024      OPERATION DATE:  05/20/2024    OPERATIVE REPORT      PREOPERATIVE DIAGNOSIS:  Nasal septal perforation.  POSTOPERATIVE DIAGNOSIS:  Nasal septal perforation.  PROCEDURE:  Placement of nasal septal button.    ANESTHESIA:  MAC.    ESTIMATED BLOOD LOSS:  None.    COMPLICATIONS:  None.    INDICATIONS:  This is an 84-year-old female who continues to have significant nasal crusting.  She has tried saline irrigations.  However, her nose continues to crust.  For the above-mentioned reason, the procedure was indicated.    OPERATIVE TECHNIQUE:  Patient was taken to the operating suite.  She was given IV sedation per the anesthesia record.  A total of 10 mL of 4% lidocaine was placed on 2 cotton pledgets to the bilateral nares.  This was done after the patient was draped in the usual fashion.  Next a ruler was used.  The perforation was noted to be 2.5 x 1.5 cm.  A template was then made out of a piece of paper and put to make sure that it did in fact cover the perforation.  Next the 7 cm septal button was cut a little larger than 3 x 2 cm.  The button was then put into position so it securely covered over the perforation.  The patient tolerated the procedure well and was taken back to the recovery room in good condition.    Dictated By Cassy Smith MD  d: 05/20/2024 14:47:15  t: 05/20/2024 18:44:30  [facility] [id number]  [facility] – [place], [place]/

## 2024-05-20 NOTE — ANESTHESIA POSTPROCEDURE EVALUATION
Patient: Michelle Alvarenga    Procedure Summary       Date: 05/20/24 Room / Location: [facility] MAIN OR 02 / [facility] MAIN OR    Anesthesia Start: 1410 Anesthesia Stop: 1440    Procedure: Placement of septal button (Nose) Diagnosis:       Nasal septal perforation      (Nasal septal perforation [J34.89])    Surgeons: Cassy Smith MD Anesthesiologist: Guicho An DO    Anesthesia Type: MAC ASA Status: 3            Anesthesia Type: No value filed.    Vitals Value Taken Time   /60 05/20/24 1440   Temp 97.3 °F (36.3 °C) 05/20/24 1440   Pulse 72 05/20/24 1440   Resp 19 05/20/24 1440   SpO2 98 % 05/20/24 1440   Vitals shown include unfiled device data.    [facility] AN Post Evaluation:   Patient Evaluated in PACU  Patient Participation: complete - patient participated  Level of Consciousness: awake  Pain Management: adequate  Airway Patency:patent  Dental exam unchanged from preop  Yes    Nausea/Vomiting: none  Cardiovascular Status: acceptable  Respiratory Status: acceptable  Postoperative Hydration acceptable      GUICHO AN DO  5/20/2024 2:41 PM

## 2024-05-20 NOTE — BRIEF OP NOTE
Pre-Operative Diagnosis: Nasal septal perforation [J34.89]     Post-Operative Diagnosis: Nasal septal perforation [J34.89]      Procedure Performed:   Placement of septal button    Surgeons and Role:     * Cassy Smith MD - Primary    Assistant(s):        Surgical Findings: 2.5 x 1.5 cm septal defect     Specimen: none     Estimated Blood Loss: No data recorded    Dictation Number:  [id number]    Cassy Smith MD  5/20/2024  2:45 PM

## 2024-05-21 ENCOUNTER — TELEPHONE (OUTPATIENT)
Dept: OTOLARYNGOLOGY | Facility: CLINIC | Age: 84
End: 2024-05-21

## 2024-05-21 NOTE — TELEPHONE ENCOUNTER
Post op day 1: Placement of septal button     Patient doing well, no bleeding, just feels like something is stuck in her nose, she knows not to blow her nose.  She hasn't picked up the Norco, not sure she will be needing it.  Went over the following instructions with her below:     HOME INSTRUCTIONS  Take Tylenol or Norco 7.5 mg for pain  Diet as tolerated  Do not manipulate your nose  Follow up in 2 weeks time, any office  Call with any problems  No prescriptions    Patient requested the [place] office, I offered all 3 locations for her post op appt. Its on June 7th.

## 2024-05-29 ENCOUNTER — TELEPHONE (OUTPATIENT)
Dept: OTOLARYNGOLOGY | Facility: CLINIC | Age: 84
End: 2024-05-29

## 2024-05-29 NOTE — TELEPHONE ENCOUNTER
Patient states that she had nasal surgery on 5/20/24, and she is having problems with congestion so it is difficult to breath through the nostril and feeling stuffed all the time, and having a lot of phlegm when she lay down. Patient states that she is not able to sleep well and is sniffling all the time. Patient wants to know if she should be seen sooner at the [place] location? I transferred the call to ENT Nurse.

## 2024-05-29 NOTE — TELEPHONE ENCOUNTER
please see note below, pt states constant runny nose -clear in color. Pt feeling more congested Pt asking if any recommendations, pt is avoiding blowing her nose. Please advise

## 2024-05-31 ENCOUNTER — OFFICE VISIT (OUTPATIENT)
Dept: OTOLARYNGOLOGY | Facility: CLINIC | Age: 84
End: 2024-05-31

## 2024-05-31 VITALS — BODY MASS INDEX: 25.76 KG/M2 | WEIGHT: 140 LBS | HEIGHT: 62 IN

## 2024-05-31 DIAGNOSIS — J34.89 NASAL CRUSTING: ICD-10-CM

## 2024-05-31 DIAGNOSIS — J34.89 NASAL SEPTAL PERFORATION: Primary | ICD-10-CM

## 2024-05-31 PROCEDURE — 3008F BODY MASS INDEX DOCD: CPT | Performed by: SPECIALIST

## 2024-05-31 PROCEDURE — 1159F MED LIST DOCD IN RCRD: CPT | Performed by: SPECIALIST

## 2024-05-31 PROCEDURE — 1160F RVW MEDS BY RX/DR IN RCRD: CPT | Performed by: SPECIALIST

## 2024-05-31 PROCEDURE — 99024 POSTOP FOLLOW-UP VISIT: CPT | Performed by: SPECIALIST

## 2024-06-01 NOTE — PROGRESS NOTES
Postoperative day #11  Patient's status postplacement of a septal button.  Complains of some nasal obstruction.  Some nasal crusting on the left fully cleaned.  Button in place.  Impression: Button in place as expected.  Plan: Okay to gently blow the nose and use saline irrigation.  Follow-up in 1 week's time, sooner if problems.

## 2024-06-01 NOTE — PATIENT INSTRUCTIONS
You had some nasal crusting in the left nares which was removed.  Okay to blow your nose and use some saline irrigation.  Follow-up in 1 week's time, sooner if problems.

## 2024-06-07 ENCOUNTER — OFFICE VISIT (OUTPATIENT)
Dept: OTOLARYNGOLOGY | Facility: CLINIC | Age: 84
End: 2024-06-07
Payer: MEDICARE

## 2024-06-07 VITALS — BODY MASS INDEX: 25.76 KG/M2 | WEIGHT: 140 LBS | HEIGHT: 62 IN

## 2024-06-07 DIAGNOSIS — J34.89 NASAL SEPTAL PERFORATION: Primary | ICD-10-CM

## 2024-06-07 DIAGNOSIS — R09.82 POSTNASAL DRIP: ICD-10-CM

## 2024-06-07 PROCEDURE — 3008F BODY MASS INDEX DOCD: CPT | Performed by: SPECIALIST

## 2024-06-07 PROCEDURE — 1159F MED LIST DOCD IN RCRD: CPT | Performed by: SPECIALIST

## 2024-06-07 PROCEDURE — 1160F RVW MEDS BY RX/DR IN RCRD: CPT | Performed by: SPECIALIST

## 2024-06-07 PROCEDURE — 99024 POSTOP FOLLOW-UP VISIT: CPT | Performed by: SPECIALIST

## 2024-06-08 NOTE — PROGRESS NOTES
Postoperative day #18  Patient's status postplacement of nasal septal button.  Patient reports persistent postnasal drip although better than before.  Physical examination: Splint in position it does not lie quite flat on the right-hand side secondary to an inferior septal spur, slight amount of crusting fully suctioned.  Impression: Patient with postnasal drip which is persistent after placement of the button.  Plan: Will irrigate with nasal saline.  Will add Claritin, Clarinex, or Allegra.  Follow-up in 1 month's time, sooner if problems.

## 2024-06-08 NOTE — PATIENT INSTRUCTIONS
Your nasal splint remains in good position.  I added Claritin, Clarinex, or Allegra for your postnasal drip.  Follow-up in 1 month's time, sooner if problems.

## 2024-07-12 ENCOUNTER — OFFICE VISIT (OUTPATIENT)
Dept: OTOLARYNGOLOGY | Facility: CLINIC | Age: 84
End: 2024-07-12

## 2024-07-12 VITALS — BODY MASS INDEX: 25.76 KG/M2 | HEIGHT: 62 IN | WEIGHT: 140 LBS

## 2024-07-12 DIAGNOSIS — J34.89 NASAL SEPTAL PERFORATION: Primary | ICD-10-CM

## 2024-07-12 DIAGNOSIS — R09.82 POSTNASAL DRIP: ICD-10-CM

## 2024-07-12 PROCEDURE — 1160F RVW MEDS BY RX/DR IN RCRD: CPT | Performed by: SPECIALIST

## 2024-07-12 PROCEDURE — 1159F MED LIST DOCD IN RCRD: CPT | Performed by: SPECIALIST

## 2024-07-12 PROCEDURE — 3008F BODY MASS INDEX DOCD: CPT | Performed by: SPECIALIST

## 2024-07-12 PROCEDURE — 99024 POSTOP FOLLOW-UP VISIT: CPT | Performed by: SPECIALIST

## 2024-07-12 RX ORDER — ALPRAZOLAM 0.25 MG/1
TABLET ORAL
COMMUNITY
Start: 2024-07-08

## 2024-07-12 RX ORDER — OMEPRAZOLE 20 MG/1
CAPSULE, DELAYED RELEASE ORAL
COMMUNITY
Start: 2024-06-12

## 2024-07-12 RX ORDER — POTASSIUM CHLORIDE 20 MEQ/1
TABLET, EXTENDED RELEASE ORAL
COMMUNITY
Start: 2024-06-17

## 2024-07-12 RX ORDER — FLUTICASONE PROPIONATE 50 MCG
2 SPRAY, SUSPENSION (ML) NASAL DAILY
Qty: 16 G | Refills: 3 | Status: SHIPPED | OUTPATIENT
Start: 2024-07-12

## 2024-07-12 RX ORDER — PRAVASTATIN SODIUM 40 MG
TABLET ORAL
COMMUNITY
Start: 2024-06-11

## 2024-07-13 NOTE — PROGRESS NOTES
Postoperative day #43.  Patient still reports some postnasal drainage.  Physical examination:  Thick nasal mucus fully suctioned from the bilateral nares.  No infection noted.  Impression: Overall doing well with the nasal button, however there was some mucoid drainage.  This was fully suctioned.  Patient placed on Flonase nasal spray.  Follow-up in 3 months time, sooner if problems.

## 2024-07-13 NOTE — PATIENT INSTRUCTIONS
Nasal splint was in good position.  Thick mucus was fully suctioned from the bilateral nares.  I placed 2 and a trial of Flonase nasal spray.  Follow-up in 3 months time, sooner if problems.

## 2024-10-11 ENCOUNTER — OFFICE VISIT (OUTPATIENT)
Dept: OTOLARYNGOLOGY | Facility: CLINIC | Age: 84
End: 2024-10-11

## 2024-10-11 VITALS — BODY MASS INDEX: 24.84 KG/M2 | WEIGHT: 135 LBS | HEIGHT: 62 IN

## 2024-10-11 DIAGNOSIS — J34.89 NASAL CRUSTING: ICD-10-CM

## 2024-10-11 DIAGNOSIS — J34.89 NASAL SEPTAL PERFORATION: Primary | ICD-10-CM

## 2024-10-11 DIAGNOSIS — R09.82 POSTNASAL DRIP: ICD-10-CM

## 2024-10-11 PROCEDURE — 1159F MED LIST DOCD IN RCRD: CPT | Performed by: SPECIALIST

## 2024-10-11 PROCEDURE — 1160F RVW MEDS BY RX/DR IN RCRD: CPT | Performed by: SPECIALIST

## 2024-10-11 PROCEDURE — 3008F BODY MASS INDEX DOCD: CPT | Performed by: SPECIALIST

## 2024-10-11 PROCEDURE — 99213 OFFICE O/P EST LOW 20 MIN: CPT | Performed by: SPECIALIST

## 2024-10-11 RX ORDER — IBUPROFEN 600 MG/1
TABLET ORAL
COMMUNITY
Start: 2023-10-27

## 2024-10-11 RX ORDER — MONTELUKAST SODIUM 10 MG/1
10 TABLET ORAL NIGHTLY
Qty: 30 TABLET | Refills: 3 | Status: SHIPPED | OUTPATIENT
Start: 2024-10-11

## 2024-10-11 RX ORDER — HYDROCODONE BITARTRATE AND ACETAMINOPHEN 10; 325 MG/1; MG/1
1 TABLET ORAL
COMMUNITY
Start: 2024-08-15

## 2024-10-11 RX ORDER — DOXYCYCLINE HYCLATE 100 MG
TABLET ORAL
COMMUNITY
Start: 2024-08-16

## 2024-10-12 NOTE — PROGRESS NOTES
Michelle Alvarenga is a 84 year old female.   Chief Complaint   Patient presents with    Follow - Up     Patient is here for nasal septal f/up, reports nasal congestion.     HPI:   Patient here although her nasal symptoms have improved she still has significant issues issues with postnasal drip.  She uses nasal irrigation.      Current Outpatient Medications   Medication Sig Dispense Refill    Doxycycline Hyclate 100 MG Oral Tab       HYDROcodone-acetaminophen  MG Oral Tab Take 1 tablet by mouth.       MG Oral Tab       montelukast 10 MG Oral Tab Take 1 tablet (10 mg total) by mouth nightly. 30 tablet 3    ALPRAZolam 0.25 MG Oral Tab       omeprazole 20 MG Oral Capsule Delayed Release       potassium chloride 20 MEQ Oral Tab CR       pravastatin 40 MG Oral Tab       fluticasone propionate 50 MCG/ACT Nasal Suspension 2 sprays by Nasal route daily. 16 g 3    acetaminophen 500 MG Oral Tab Take 1 tablet (500 mg total) by mouth daily as needed.      cyanocobalamin 1000 MCG Oral Tab Take 100 mcg by mouth daily.      ipratropium 0.06 % Nasal Solution 2 sprays by Nasal route 3 (three) times daily. 15 mL 3    azelastine 0.1 % Nasal Solution by Nasal route As Directed.      levothyroxine 50 MCG Oral Tab Take 1 tablet (50 mcg total) by mouth before breakfast.      losartan 50 MG Oral Tab Take 1 tablet (50 mg total) by mouth every morning.      lisinopril 10 MG Oral Tab Take 1 tablet (10 mg total) by mouth every morning.        Past Medical History:    Cancer (HCC)    breast cancer    Essential hypertension    Exposure to medical diagnostic radiation    Hearing impairment    hearing aides    High blood pressure    Visual impairment    glasses      Social History:  Social History     Socioeconomic History    Marital status:    Tobacco Use    Smoking status: Former     Types: Cigarettes    Smokeless tobacco: Never   Vaping Use    Vaping status: Never Used   Substance and Sexual Activity    Alcohol use: Yes      Comment: glass of wine occasionally    Drug use: Never        REVIEW OF SYSTEMS:   GENERAL HEALTH: feels well otherwise  GENERAL : denies fever, chills, sweats, weight loss, weight gain  SKIN: denies any unusual skin lesions or rashes  RESPIRATORY: denies shortness of breath with exertion  NEURO: denies headaches    EXAM:   Ht 5' 2\" (1.575 m)   Wt 135 lb (61.2 kg)   LMP  (LMP Unknown)   BMI 24.69 kg/m²   System Details   Skin Inspection - Normal.   Constitutional Overall appearance - Normal.   Head/Face Facial features - Normal. Eyebrows - Normal. Skull - Normal.   Eyes Conjunctiva - Right: Normal, Left: Normal. Pupil - Right: Normal, Left: Normal.    Ears Inspection - Right: Normal, Left: Normal.   Canal - Right: Normal, Left: Normal.   TM - Right: Normal, Left: Normal.   Nasal External nose - Normal.   Nasal septum -the perforation nasal button in good position.  Turbinates -no nasal crusting but mucoid postnasal drainage   Oral/Oropharynx Lips - Normal, Tonsils - Normal, Tongue - Normal    Neck Exam Inspection - Normal. Palpation - Normal. Parotid gland - Normal. Thyroid gland - Normal.   Lymph Detail Submental. Submandibular. Anterior cervical. Posterior cervical. Supraclavicular all without enlargement   Psychiatric Orientation - Oriented to time, place, person & situation. Appropriate mood and affect.   Neurological Memory - Normal. Cranial nerves - Cranial nerves II through XII grossly intact.     ASSESSMENT AND PLAN:   1. Nasal septal perforation  Nasal button in good position    2. Postnasal drip  Trial of Singulair    3. Nasal crusting  Resolved with nasal button and nasal irrigation.  Follow-up in 3 months time, sooner if problems.        The patient indicates understanding of these issues and agrees to the plan.      Cassy Smith MD  10/11/2024  10:05 PM

## 2024-10-12 NOTE — PATIENT INSTRUCTIONS
Nasal button is in good position.   No evidence of nasal crusting.  I placed you on Singulair for your postnasal drip.  Follow-up in 3 months time, sooner if problems.  Okay to continue nasal irrigation.